# Patient Record
Sex: FEMALE | URBAN - METROPOLITAN AREA
[De-identification: names, ages, dates, MRNs, and addresses within clinical notes are randomized per-mention and may not be internally consistent; named-entity substitution may affect disease eponyms.]

---

## 2024-08-08 ENCOUNTER — OFFICE VISIT (OUTPATIENT)
Dept: URBAN - METROPOLITAN AREA CLINIC 98 | Facility: CLINIC | Age: 19
End: 2024-08-08
Payer: SELF-PAY

## 2024-08-08 ENCOUNTER — DASHBOARD ENCOUNTERS (OUTPATIENT)
Age: 19
End: 2024-08-08

## 2024-08-08 VITALS
DIASTOLIC BLOOD PRESSURE: 87 MMHG | BODY MASS INDEX: 23.28 KG/M2 | WEIGHT: 126.5 LBS | HEIGHT: 62 IN | SYSTOLIC BLOOD PRESSURE: 135 MMHG | TEMPERATURE: 97.1 F | HEART RATE: 89 BPM

## 2024-08-08 DIAGNOSIS — R19.5 MUCUS IN STOOL: ICD-10-CM

## 2024-08-08 DIAGNOSIS — K62.5 RECTAL BLEEDING: ICD-10-CM

## 2024-08-08 DIAGNOSIS — R14.2 BELCHING: ICD-10-CM

## 2024-08-08 DIAGNOSIS — K21.9 GASTROESOPHAGEAL REFLUX DISEASE, UNSPECIFIED WHETHER ESOPHAGITIS PRESENT: ICD-10-CM

## 2024-08-08 PROBLEM — 235595009: Status: ACTIVE | Noted: 2024-08-08

## 2024-08-08 PROCEDURE — 99203 OFFICE O/P NEW LOW 30 MIN: CPT

## 2024-08-08 NOTE — PHYSICAL EXAM CHEST:
chest wall non-tender, breathing is unlabored without accessory muscle use, normal breath sounds
Decreased ADLs 2/2 weakness

## 2024-08-08 NOTE — HPI-TODAY'S VISIT:
8/8/2024- Hrelinda James,NP - 17 yo female new patient here with complaints of rectal bleeding, reflux, and flatulence, and belching - No PCP - First episode of rectal bleeding 4/2023, lasted 3-4 months noticed in toilet and on tissue paper. Always with BM. - Second episode started in 7/2024 this year, still bleeding in toilet and tissue paper, only with BM - BM 1-3 times per day - Stools are formed soft - Noticed Mucus in stool since April 2023 - No melena or abdominal pain - No family Hx. IBD, colon cancer or colitis - recently increase in reflux "burning in back of her throat" - Noticed with greasy foods-changed diet and reflux is a little better - Denies taking medications to treat - No nausea, vomiting, dysphagia - No unintentional weight loss

## 2024-08-11 ENCOUNTER — LAB OUTSIDE AN ENCOUNTER (OUTPATIENT)
Dept: URBAN - METROPOLITAN AREA CLINIC 98 | Facility: CLINIC | Age: 19
End: 2024-08-11

## 2024-10-15 ENCOUNTER — CLAIMS CREATED FROM THE CLAIM WINDOW (OUTPATIENT)
Dept: URBAN - METROPOLITAN AREA SURGERY CENTER 18 | Facility: SURGERY CENTER | Age: 19
End: 2024-10-15
Payer: COMMERCIAL

## 2024-10-15 ENCOUNTER — CLAIMS CREATED FROM THE CLAIM WINDOW (OUTPATIENT)
Dept: URBAN - METROPOLITAN AREA CLINIC 4 | Facility: CLINIC | Age: 19
End: 2024-10-15
Payer: COMMERCIAL

## 2024-10-15 DIAGNOSIS — K92.1 HEMATOCHEZIA: ICD-10-CM

## 2024-10-15 DIAGNOSIS — K51.911 ULCERATIVE COLITIS WITH RECTAL BLEEDING: ICD-10-CM

## 2024-10-15 DIAGNOSIS — K51.911 ULCERATIVE COLITIS, UNSPECIFIED WITH RECTAL BLEEDING: ICD-10-CM

## 2024-10-15 DIAGNOSIS — K51.20 ACUTE ULCERATIVE PROCTITIS: ICD-10-CM

## 2024-10-15 PROCEDURE — 45380 COLONOSCOPY AND BIOPSY: CPT | Performed by: INTERNAL MEDICINE

## 2024-10-15 PROCEDURE — 88305 TISSUE EXAM BY PATHOLOGIST: CPT | Performed by: PATHOLOGY

## 2024-10-15 PROCEDURE — 00811 ANES LWR INTST NDSC NOS: CPT | Performed by: NURSE ANESTHETIST, CERTIFIED REGISTERED
